# Patient Record
Sex: FEMALE | Race: OTHER | HISPANIC OR LATINO | Employment: UNEMPLOYED | URBAN - METROPOLITAN AREA
[De-identification: names, ages, dates, MRNs, and addresses within clinical notes are randomized per-mention and may not be internally consistent; named-entity substitution may affect disease eponyms.]

---

## 2021-04-25 ENCOUNTER — TELEPHONE (OUTPATIENT)
Dept: OTHER | Facility: OTHER | Age: 61
End: 2021-04-25

## 2021-04-26 NOTE — TELEPHONE ENCOUNTER
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

## 2021-04-27 NOTE — TELEPHONE ENCOUNTER
Spoke with patient, feeling unwell she believes due to allergies  Will be getting tested for Covid   Will call us back to reschedule once she receives results back

## 2021-05-06 ENCOUNTER — OFFICE VISIT (OUTPATIENT)
Dept: OBGYN CLINIC | Facility: MEDICAL CENTER | Age: 61
End: 2021-05-06
Payer: COMMERCIAL

## 2021-05-06 VITALS
WEIGHT: 243 LBS | SYSTOLIC BLOOD PRESSURE: 140 MMHG | DIASTOLIC BLOOD PRESSURE: 87 MMHG | HEART RATE: 72 BPM | HEIGHT: 63 IN | BODY MASS INDEX: 43.05 KG/M2

## 2021-05-06 DIAGNOSIS — M17.11 PRIMARY OSTEOARTHRITIS OF RIGHT KNEE: Primary | ICD-10-CM

## 2021-05-06 DIAGNOSIS — M19.019 OSTEOARTHRITIS OF AC (ACROMIOCLAVICULAR) JOINT: ICD-10-CM

## 2021-05-06 DIAGNOSIS — M75.102 NONTRAUMATIC TEAR OF LEFT ROTATOR CUFF, UNSPECIFIED TEAR EXTENT: ICD-10-CM

## 2021-05-06 PROCEDURE — 99203 OFFICE O/P NEW LOW 30 MIN: CPT | Performed by: ORTHOPAEDIC SURGERY

## 2021-05-06 RX ORDER — ALBUTEROL SULFATE 90 UG/1
2 AEROSOL, METERED RESPIRATORY (INHALATION)
COMMUNITY
Start: 2021-04-15

## 2021-05-06 RX ORDER — FLUTICASONE PROPIONATE 220 UG/1
AEROSOL, METERED RESPIRATORY (INHALATION)
COMMUNITY
Start: 2021-04-15

## 2021-05-06 RX ORDER — ACETAMINOPHEN 500 MG
500 TABLET ORAL
COMMUNITY

## 2021-05-06 RX ORDER — METOPROLOL SUCCINATE 50 MG/1
50 TABLET, EXTENDED RELEASE ORAL
COMMUNITY
Start: 2021-04-15

## 2021-05-06 NOTE — PROGRESS NOTES
Ortho Sports Medicine Knee New Patient Visit     Assesment:   64 y o  female right knee osteoarthritis and left shoulder possible rotator cuff tear    Plan:    Conservative treatment:    Ice to knee for 20 minutes at least 1-2 times daily  PT for ROM/strengthening to knee, hip and core and shoulder    Imaging:    No imaging was available for review today  X-rays at next visit    Injection:    A viscosupplementation injection was ordered and will be given at a future visit  Surgery:     No surgery is recommended at this point, continue with conservative treatment plan as noted  Follow up:    Return after VISCO  Chief Complaint   Patient presents with    Right Knee - Pain    Left Shoulder - Pain       History of Present Illness: The patient is a 64 y o  female whose occupation is disabled, referred to me by their primary care physician, seen in clinic for consultation of right knee pain and left shoulder  Pain is located anterior, posterior, lateral   The patient rates the pain as a 7/10  The pain has been present for 2 months  The shoulder bothers her all over  The patient denied sustaining any injury to the shoulder or knee  The mechanism of injury was uknown  The pain is characterized as dull, achy  The pain is present at all times  Pain is improved by rest, ice and NSAIDS  Pain is aggravated by stairs, squatting, weight bearing, walking, sitting, standing and pivoting on a planted foot  Symptoms include clicking, catching and popping  The patient has tried rest, ice and NSAIDS  Knee Surgical History:  None    Past Medical, Social and Family History:  History reviewed  No pertinent past medical history  History reviewed  No pertinent surgical history    Allergies   Allergen Reactions    Biaxin [Clarithromycin] Rash    Tetracycline Rash     Current Outpatient Medications on File Prior to Visit   Medication Sig Dispense Refill    albuterol (Seleta Fling HFA) 90 mcg/act inhaler Inhale 2 puffs      metoprolol succinate (TOPROL-XL) 50 mg 24 hr tablet Take 50 mg by mouth      acetaminophen (TYLENOL) 500 mg tablet Take 500 mg by mouth      Flovent  MCG/ACT inhaler inhale 1 puff INTO THE LUNGS twice a day      Menthol, Topical Analgesic, 16 % GEL Apply topically       No current facility-administered medications on file prior to visit  Social History     Socioeconomic History    Marital status: Single     Spouse name: Not on file    Number of children: Not on file    Years of education: Not on file    Highest education level: Not on file   Occupational History    Not on file   Social Needs    Financial resource strain: Not on file    Food insecurity     Worry: Not on file     Inability: Not on file    Transportation needs     Medical: Not on file     Non-medical: Not on file   Tobacco Use    Smoking status: Never Smoker    Smokeless tobacco: Never Used   Substance and Sexual Activity    Alcohol use: Not Currently    Drug use: Never    Sexual activity: Not on file   Lifestyle    Physical activity     Days per week: Not on file     Minutes per session: Not on file    Stress: Not on file   Relationships    Social connections     Talks on phone: Not on file     Gets together: Not on file     Attends Jainism service: Not on file     Active member of club or organization: Not on file     Attends meetings of clubs or organizations: Not on file     Relationship status: Not on file    Intimate partner violence     Fear of current or ex partner: Not on file     Emotionally abused: Not on file     Physically abused: Not on file     Forced sexual activity: Not on file   Other Topics Concern    Not on file   Social History Narrative    Not on file         I have reviewed the past medical, surgical, social and family history, medications and allergies as documented in the EMR      Review of systems: ROS is negative other than that noted in the HPI   Constitutional: Negative for fatigue and fever  HENT: Negative for sore throat  Respiratory: Negative for shortness of breath  Cardiovascular: Negative for chest pain  Gastrointestinal: Negative for abdominal pain  Endocrine: Negative for cold intolerance and heat intolerance  Genitourinary: Negative for flank pain  Musculoskeletal: Negative for back pain  Skin: Negative for rash  Allergic/Immunologic: Negative for immunocompromised state  Neurological: Negative for dizziness  Psychiatric/Behavioral: Negative for agitation  Physical Exam:    Blood pressure 140/87, pulse 72, height 5' 3" (1 6 m), weight 110 kg (243 lb)  General/Constitutional: NAD, well developed, well nourished  HENT: Normocephalic, atraumatic  CV: Intact distal pulses, regular rate  Resp: No respiratory distress or labored breathing  Lymphatic: No lymphadenopathy palpated  Neuro: Alert and Oriented x 3, no focal deficits  Psych: Normal mood, normal affect, normal judgement, normal behavior  Skin: Warm, dry, no rashes, no erythema      Knee Exam (focused): RIGHT LEFT   ROM:   0-130 0-130   Palpation: Effusion negative negative     MJL tenderness Negative Negative     LJL tenderness Negative Negative   Meniscus:  Esther Negative Negative    Apley's Compression Negative Negative   Instability: Varus stable stable     Valgus stable stable   Special Tests: Lachman Negative Negative     Posterior drawer Negative Negative     Anterior drawer Negative Negative     Pivot shift not tested not tested     Dial not tested not tested   Patella: Palpation no tenderness no tenderness     Mobility 1/4 1/4     Apprehension Negative Negative   Other: Single leg 1/4 squat not tested not tested      Shoulder focused exam:       RIGHT LEFT    Scapula Atrophy Negative Negative     Winging Negative Negative     Protraction Negative Negative    Rotator cuff SS 5/5 4/5     IS 5/5 5/5     SubS 5/5 5/5    ROM     170 ER0 60 60     ER90 90    90     IR90 T6    T6     IRb T6    T6    TTP: AC Negative Positive     Biceps Negative Negative     Coracoid Negative Negative    Special Tests: O'Briens Negative Negative     Payne-shear Negative Negative     Cross body Adduction Negative Negative     Speeds  Negative Negative     Srinivas's Negative Negative     Whipple Negative Positive       Neer Negative Negative     Cochran Negative Negative    Instability: Apprehension & relocation not tested not tested     Load & shift not tested not tested    Other: Crank Negative Negative                 UE NV Exam: +2 Radial pulses bilaterally  Sensation intact to light touch C5-T1 bilaterally, Radial/median/ulnar nerve motor intact      Bilateral elbow, wrist, and and forearm ROM full, painless with passive ROM, no ttp or crepitance throughout extremities below shoulder joint    Cervical ROM is full without pain, numbness or tingling      LE NV Exam: +2 DP/PT pulses bilaterally  Sensation intact to light touch L2-S1 bilaterally     Bilateral hip ROM demonstrates no pain actively or passively    No calf tenderness to palpation bilaterally    Knee Imaging     No imaging available - we will obtain x-rays at next visit      Scribe Attestation    I,:  Duke Roman am acting as a scribe while in the presence of the attending physician :       I,:  [facility] DO Irene personally performed the services described in this documentation    as scribed in my presence :

## 2021-05-07 ENCOUNTER — TELEPHONE (OUTPATIENT)
Dept: OBGYN CLINIC | Facility: OTHER | Age: 61
End: 2021-05-07

## 2021-05-07 NOTE — TELEPHONE ENCOUNTER
[facility] [place] [facility] [employer] is requesting we fax the Prescription for [employer]      Fax # [phone]  Attn : [facility]

## 2021-05-13 ENCOUNTER — EVALUATION (OUTPATIENT)
Dept: PHYSICAL THERAPY | Facility: CLINIC | Age: 61
End: 2021-05-13
Payer: COMMERCIAL

## 2021-05-13 DIAGNOSIS — M17.11 PRIMARY OSTEOARTHRITIS OF RIGHT KNEE: ICD-10-CM

## 2021-05-13 DIAGNOSIS — M75.102 NONTRAUMATIC TEAR OF LEFT ROTATOR CUFF, UNSPECIFIED TEAR EXTENT: ICD-10-CM

## 2021-05-13 DIAGNOSIS — M19.019 OSTEOARTHRITIS OF AC (ACROMIOCLAVICULAR) JOINT: ICD-10-CM

## 2021-05-13 PROCEDURE — 97162 PT EVAL MOD COMPLEX 30 MIN: CPT | Performed by: PHYSICAL THERAPIST

## 2021-05-13 NOTE — PROGRESS NOTES
PT Evaluation     Today's date: 2021  Patient name: Randolm Schlatter  : 1960  MRN: [id number]  Referring provider: Rito Amato DO  Dx:   Encounter Diagnosis     ICD-10-CM    1  Primary osteoarthritis of right knee  M17 11 Ambulatory referral to Physical Therapy   2  Nontraumatic tear of left rotator cuff, unspecified tear extent  M75 102 Ambulatory referral to Physical Therapy   3  Osteoarthritis of AC (acromioclavicular) joint  M19 019 Ambulatory referral to Physical Therapy                  Assessment  Assessment details: Randolm Schlatter is a 64 y o  female presenting to PT with pain, decreased knee range of motion, decreased shoulder ROM, decreased upper and lower extremity strength, balance deficits, and decreased tolerance to activity  Due to these impairments, pt has difficulty with walking, stair climbing, OH motion, performing household chores, dressing, and bathing independently  Patient would benefit from skilled PT services to address these impairments and to maximize function in order to improve quality of life  Thank you for the referral    Impairments: abnormal gait, abnormal or restricted ROM, activity intolerance, impaired balance, impaired physical strength, lacks appropriate home exercise program, pain with function and poor body mechanics    Symptom irritability: moderateUnderstanding of Dx/Px/POC: excellent  Goals  STG  1) Pain levels will improve by 2-3 in 3 weeks  2) L shoulder ROM will improve >15 deg  In all limited planes in 3 weeks  3) Pt will be able to ambulate with >150 feet with SPC in 3 weeks  4) Pt will reduce 5x STS time by >20 seconds in 3 weeks to reduce the risk of falls  LTG  1) Patient is independent in HEP  2) Patient will ascend/descend one flight of stairs independently with less use of the railing in 6 weeks  3) L shoulder ROM will be WFL by DC  4) R knee strength will improve by 1/2 grade in 6 weeks    5) Pt will be able to complete OH activity with L UE with limited difficulty by DC  Plan  Patient would benefit from: skilled physical therapy  Planned modality interventions: cryotherapy and hydrotherapy  Planned therapy interventions: balance, abdominal trunk stabilization, joint mobilization, manual therapy, massage, neuromuscular re-education, patient education, breathing training, body mechanics training, therapeutic activities, stretching, strengthening, flexibility, gait training, functional ROM exercises, therapeutic exercise, home exercise program and postural training  Frequency: 2x week  Duration in weeks: 4  Treatment plan discussed with: patient        Subjective Evaluation    History of Present Illness  Mechanism of injury: Pt had R hip done 2011 and her R knee done 2020  She was ready to go back to work in 2020 but was having a lot of pain in her L hip  Her doctor took xrays and saw that it was bone on bone, so got her L hip done on 2020  Currently, her L shoulder is the worst pain  She notes that she cannot lift a gallon of milk with her L side  She can brush her hair, but it is difficult  She notes that she does a lot with her R shoulder, but that one is starting to hurt as well  She lives in a one floor cabin, she has one step in  She notes that she needs to hold onto something and it is hard for her  She does use a SPC on a daily basis, and when she has bad days, she uses a rolling walker  Sleeping is very difficult for her  She notes that she barely sleeps, she has sleep apnea and just does not sleep  She did get approved to do injections in her L knee, she is going on   She notes that all activities at home are difficult with her shoulder and knee  She notes that her low back bothers her a lot, but she is dealing more with her knee and shoulder pain  Pt was a CNA, is retired now     Quality of life: excellent    Pain  Current pain ratin  At best pain ratin  At worst pain ratin  Location: L shoulder, R knee joint   Quality: throbbing and knife-like  Relieving factors: ice  Aggravating factors: walking, standing, stair climbing and lifting    Social Support  Steps to enter house: yes (1 step to get in)  Stairs in house: no   Lives in: [place] house  Lives with: adult children    Employment status: not working  Hand dominance: right      Diagnostic Tests  No diagnostic tests performed  Treatments  No previous or current treatments  Patient Goals  Patient goals for therapy: decreased pain, increased motion, increased strength, independence with ADLs/IADLs and improved balance  Patient goal: " I want to be able to walk and not have sharp pain all the time "        Objective     Postural Observations  Seated posture: poor  Standing posture: poor        Palpation   Left   Hypertonic in the upper trapezius  Tenderness of the middle trapezius, supraspinatus and upper trapezius  Tenderness     Left Shoulder   Tenderness in the [facility] joint and medial scapula       Active Range of Motion   Left Shoulder   Flexion: 145 degrees with pain  Abduction: 125 degrees with pain  External rotation BTH: C3 with pain  Internal rotation BTB: lumbar     Right Shoulder   Flexion: WFL  Abduction: 145 degrees with pain  External rotation BTH: C7   Internal rotation BTB: T9   Left Knee   Normal active range of motion    Right Knee   Normal active range of motion  Flexion: WFL and with pain  Extension: WFL and with pain    Strength/Myotome Testing     Left Shoulder     Planes of Motion   Flexion: 4- (pain)   Abduction: 3+ (pain)   External rotation at 90°: 4-   Internal rotation at 90°: 4-     Isolated Muscles   Middle deltoid: 4     Right Shoulder     Planes of Motion   Flexion: 4   Abduction: 4   External rotation at 90°: 4   Internal rotation at 90°: 4     Isolated Muscles   Middle deltoid: 4     Left Hip   Planes of Motion   Flexion: 4  Abduction: 4  Adduction: 4-    Right Hip   Planes of Motion Flexion: 4-  Abduction: 4  Adduction: 4-    Left Knee   Flexion: 4  Extension: 4  Quadriceps contraction: fair    Right Knee   Flexion: 4  Extension: 4- (pain)  Quadriceps contraction: poor    Tests     Left Shoulder   Positive empty can, full can and Hawkin's  Negative AC shear, belly press, drop arm and Yergason's  Ambulation     Observational Gait   Decreased walking speed, stride length, left step length and right step length  Left arm swing: decreased  Right arm swing: decreased    Additional Observational Gait Details  Pt using a SPC at IE today, she notes that she uses a rolling walker at home for ambulation     Fair heel/toe pattern BL, antalgic gait due to R knee pain during ambulation  Comments   5x STS: 1' 17"         Precautions: N/A     Daily Treatment Diary:      Initial Evaluation Date: 05/13/21  Compliance 5/13                     Visit Number 1                    Re-Eval  IE                 [address]                           5/13                     Manual                      L shoulder ROM                      R knee patellar mobs  R knee ROM                       Ther-Ex                      pullys                      Nu-step                      Quad sets HEP                     Heels slides HEP                     Mini squats                      SLR                      clamshells HEP                     Hip add    HEP                                  Shoulder cane flex/abd HEP            Shoulder retraction HEP            Finger ladder             Shoulder isometrics                                                                      Neuro Re-Ed                                                                                                Ther-Act                                                               Modalities

## 2021-05-13 NOTE — LETTER
May 19, 2021    Tomas Sadler DO  [address]   [address]    Patient: Chata Gutiérrez   YOB: 1960   Date of Visit: 2021     Encounter Diagnosis     ICD-10-CM    1  Primary osteoarthritis of right knee  M17 11 Ambulatory referral to Physical Therapy   2  Nontraumatic tear of left rotator cuff, unspecified tear extent  M75 102 Ambulatory referral to Physical Therapy   3  Osteoarthritis of AC (acromioclavicular) joint  M19 019 Ambulatory referral to Physical Therapy       Dear Dr Johan Monaco: Thank you for your recent referral of Chata Gutiérrez  Please review the attached evaluation summary from Norma's recent visit  Please verify that you agree with the plan of care by signing the attached order  If you have any questions or concerns, please do not hesitate to call  I sincerely appreciate the opportunity to share in the care of one of your patients and hope to have another opportunity to work with you in the near future  Sincerely,    Sagrario Tellez, PT      Referring Provider:      I certify that I have read the below Plan of Care and certify the need for these services furnished under this plan of treatment while under my care  Familia Little [address] [address]  Via Fax: [phone]          PT Evaluation     Today's date: 2021  Patient name: Chata Gutiérrez  : 1960  MRN: [id number]  Referring provider: Celeste Schofield DO  Dx:   Encounter Diagnosis     ICD-10-CM    1  Primary osteoarthritis of right knee  M17 11 Ambulatory referral to Physical Therapy   2  Nontraumatic tear of left rotator cuff, unspecified tear extent  M75 102 Ambulatory referral to Physical Therapy   3   Osteoarthritis of AC (acromioclavicular) joint  M19 019 Ambulatory referral to Physical Therapy                  Assessment  Assessment details: Chata Gutiérrez is a 64 y o  female presenting to PT with pain, decreased knee range of motion, decreased shoulder ROM, decreased upper and lower extremity strength, balance deficits, and decreased tolerance to activity  Due to these impairments, pt has difficulty with walking, stair climbing, OH motion, performing household chores, dressing, and bathing independently  Patient would benefit from skilled PT services to address these impairments and to maximize function in order to improve quality of life  Thank you for the referral    Impairments: abnormal gait, abnormal or restricted ROM, activity intolerance, impaired balance, impaired physical strength, lacks appropriate home exercise program, pain with function and poor body mechanics    Symptom irritability: moderateUnderstanding of Dx/Px/POC: excellent  Goals  STG  1) Pain levels will improve by 2-3 in 3 weeks  2) L shoulder ROM will improve >15 deg  In all limited planes in 3 weeks  3) Pt will be able to ambulate with >150 feet with SPC in 3 weeks  4) Pt will reduce 5x STS time by >20 seconds in 3 weeks to reduce the risk of falls  LTG  1) Patient is independent in HEP  2) Patient will ascend/descend one flight of stairs independently with less use of the railing in 6 weeks  3) L shoulder ROM will be WFL by DC  4) R knee strength will improve by 1/2 grade in 6 weeks  5) Pt will be able to complete OH activity with L UE with limited difficulty by DC        Plan  Patient would benefit from: skilled physical therapy  Planned modality interventions: cryotherapy and hydrotherapy  Planned therapy interventions: balance, abdominal trunk stabilization, joint mobilization, manual therapy, massage, neuromuscular re-education, patient education, breathing training, body mechanics training, therapeutic activities, stretching, strengthening, flexibility, gait training, functional ROM exercises, therapeutic exercise, home exercise program and postural training  Frequency: 2x week  Duration in weeks: 4  Treatment plan discussed with: patient        Subjective Evaluation    History of Present Illness  Mechanism of injury: Pt had R hip done 2011 and her R knee done 2020  She was ready to go back to work in 2020 but was having a lot of pain in her L hip  Her doctor took xrays and saw that it was bone on bone, so got her L hip done on 2020  Currently, her L shoulder is the worst pain  She notes that she cannot lift a gallon of milk with her L side  She can brush her hair, but it is difficult  She notes that she does a lot with her R shoulder, but that one is starting to hurt as well  She lives in a one floor cabin, she has one step in  She notes that she needs to hold onto something and it is hard for her  She does use a SPC on a daily basis, and when she has bad days, she uses a rolling walker  Sleeping is very difficult for her  She notes that she barely sleeps, she has sleep apnea and just does not sleep  She did get approved to do injections in her L knee, she is going on   She notes that all activities at home are difficult with her shoulder and knee  She notes that her low back bothers her a lot, but she is dealing more with her knee and shoulder pain  Pt was a CNA, is retired now     Quality of life: excellent    Pain  Current pain ratin  At best pain ratin  At worst pain ratin  Location: L shoulder, R knee joint   Quality: throbbing and knife-like  Relieving factors: ice  Aggravating factors: walking, standing, stair climbing and lifting    Social Support  Steps to enter house: yes (1 step to get in)  Stairs in house: no   Lives in: [facility]  Lives with: adult children    Employment status: not working  Hand dominance: right      Diagnostic Tests  No diagnostic tests performed  Treatments  No previous or current treatments  Patient Goals  Patient goals for therapy: decreased pain, increased motion, increased strength, independence with ADLs/IADLs and improved balance  Patient goal: " I want to be able to walk and not have sharp pain all the time "        Objective     Postural Observations  Seated posture: poor  Standing posture: poor        Palpation   Left   Hypertonic in the upper trapezius  Tenderness of the middle trapezius, supraspinatus and upper trapezius  Tenderness     Left Shoulder   [facility] and medial scapula  Active Range of Motion   Left Shoulder   Flexion: 145 degrees with pain  Abduction: 125 degrees with pain  External rotation BTH: C3 with pain  Internal rotation BTB: lumbar     Right Shoulder   Flexion: WFL  Abduction: 145 degrees with pain  External rotation BTH: C7   Internal rotation BTB: T9   Left Knee   Normal active range of motion    Right Knee   Normal active range of motion  Flexion: WFL and with pain  Extension: WFL and with pain    Strength/Myotome Testing     Left Shoulder     Planes of Motion   Flexion: 4- (pain)   Abduction: 3+ (pain)   External rotation at 90°: 4-   Internal rotation at 90°: 4-     Isolated Muscles   Middle deltoid: 4     Right Shoulder     Planes of Motion   Flexion: 4   Abduction: 4   External rotation at 90°: 4   Internal rotation at 90°: 4     Isolated Muscles   Middle deltoid: 4     Left Hip   Planes of Motion   Flexion: 4  Abduction: 4  Adduction: 4-    Right Hip   Planes of Motion   Flexion: 4-  Abduction: 4  Adduction: 4-    Left Knee   Flexion: 4  Extension: 4  Quadriceps contraction: fair    Right Knee   Flexion: 4  Extension: 4- (pain)  Quadriceps contraction: poor    Tests     Left Shoulder   Positive empty can, full can and Hawkin's  Negative AC shear, belly press, drop arm and Yergason's  Ambulation     Observational Gait   Decreased walking speed, stride length, left step length and right step length     Left arm swing: decreased  Right arm swing: decreased    Additional Observational Gait Details  Pt using a SPC at  today, she notes that she uses a rolling walker at home for ambulation     Fair heel/toe pattern BL, antalgic gait due to R knee pain during ambulation  Comments   5x STS: 1' 17"         Precautions: N/A     Daily Treatment Diary:      Initial Evaluation Date: 05/13/21  Compliance 5/13                     Visit Number 1                    Re-Eval  IE                 [address]   Foto Captured Y                           5/13                     Manual                      L shoulder ROM                      R knee patellar mobs  R knee ROM                       Ther-Ex                      pullys                      Nu-step                      Quad sets HEP                     Heels slides HEP                     Mini squats                      SLR                      clamshells HEP                     Hip add    HEP                                  Shoulder cane flex/abd HEP            Shoulder retraction HEP            Finger ladder             Shoulder isometrics                                                                      Neuro Re-Ed                                                                                                Ther-Act                                                               Modalities

## 2021-05-17 ENCOUNTER — OFFICE VISIT (OUTPATIENT)
Dept: PHYSICAL THERAPY | Facility: CLINIC | Age: 61
End: 2021-05-17
Payer: COMMERCIAL

## 2021-05-17 DIAGNOSIS — M75.102 NONTRAUMATIC TEAR OF LEFT ROTATOR CUFF, UNSPECIFIED TEAR EXTENT: ICD-10-CM

## 2021-05-17 DIAGNOSIS — M19.019 OSTEOARTHRITIS OF AC (ACROMIOCLAVICULAR) JOINT: ICD-10-CM

## 2021-05-17 DIAGNOSIS — M17.11 PRIMARY OSTEOARTHRITIS OF RIGHT KNEE: Primary | ICD-10-CM

## 2021-05-17 PROCEDURE — 97110 THERAPEUTIC EXERCISES: CPT | Performed by: PHYSICAL THERAPIST

## 2021-05-17 PROCEDURE — 97140 MANUAL THERAPY 1/> REGIONS: CPT | Performed by: PHYSICAL THERAPIST

## 2021-05-17 NOTE — PROGRESS NOTES
Daily Note/ DC Summary      Today's date: 2021  Patient name: Jose G Maynard  : 1960  MRN: [id number]  Referring provider: Tuyet Voss DO  Dx:   Encounter Diagnosis     ICD-10-CM    1  Primary osteoarthritis of right knee  M17 11    2  Nontraumatic tear of left rotator cuff, unspecified tear extent  M75 102    3  Osteoarthritis of AC (acromioclavicular) joint  M19 019                   Subjective: Pt reports that her knee is feeling a lot better today, states that the shoulder exercises were a little painful, but she did them  Objective: See treatment diary below      Assessment: Tolerated treatment well  Introduced new exercises today, she was able to perform with moderate cuing for proper form and intensity  Pt does have some fear of her shoulder pain with all movement, educated pt on working through pain free ranges to help with her limited motion  She had a difficult time relaxing during manual work, will continue to work on in future sessions  Patient demonstrated fatigue post treatment and would benefit from continued PT to address ROM in shoulder and strength in LE  Plan: Continue per plan of care  Precautions: N/A     Daily Treatment Diary:      Initial Evaluation Date: 21  Compliance                    Visit Number 1 2                   Re-Eval  IE -                MC   Foto Captured Y -                                             Manual                      L shoulder ROM   arb                    R knee patellar mobs  -                   R knee ROM    -                   Ther-Ex                      pullys   5'                   Nu-step   5' L3                   Quad sets HEP  2x10 5"                   Heels slides HEP  20x                   Mini squats                      SLR   10x ea                   clamshells HEP  red 2x10                   Hip add  HEP  2x10 5"                                Shoulder cane flex/abd HEP 5x flex  p! Shoulder retraction HEP 15x 5"           Finger ladder  -           Shoulder isometrics  -                                                                    Neuro Re-Ed                                                                                                Ther-Act             Sit to stand 10x ea  10x                                               Modalities                      CP prn   10' post kyle SOFIA Summary: Pt has not called to schedule or return any phone calls regarding future appointments  At this time, pt is a self DC from therapy       Phillip Marroquin PT

## 2021-05-19 ENCOUNTER — APPOINTMENT (OUTPATIENT)
Dept: PHYSICAL THERAPY | Facility: CLINIC | Age: 61
End: 2021-05-19
Payer: COMMERCIAL

## 2021-05-19 ENCOUNTER — TRANSCRIBE ORDERS (OUTPATIENT)
Dept: PHYSICAL THERAPY | Facility: CLINIC | Age: 61
End: 2021-05-19

## 2021-05-19 DIAGNOSIS — M17.11 PRIMARY OSTEOARTHRITIS OF RIGHT KNEE: Primary | ICD-10-CM

## 2021-07-15 ENCOUNTER — PROCEDURE VISIT (OUTPATIENT)
Dept: OBGYN CLINIC | Facility: MEDICAL CENTER | Age: 61
End: 2021-07-15
Payer: COMMERCIAL

## 2021-07-15 ENCOUNTER — APPOINTMENT (OUTPATIENT)
Dept: RADIOLOGY | Facility: MEDICAL CENTER | Age: 61
End: 2021-07-15
Payer: COMMERCIAL

## 2021-07-15 VITALS
HEIGHT: 63 IN | DIASTOLIC BLOOD PRESSURE: 82 MMHG | WEIGHT: 243 LBS | SYSTOLIC BLOOD PRESSURE: 149 MMHG | BODY MASS INDEX: 43.05 KG/M2 | HEART RATE: 76 BPM

## 2021-07-15 DIAGNOSIS — M17.11 PRIMARY OSTEOARTHRITIS OF RIGHT KNEE: Primary | ICD-10-CM

## 2021-07-15 DIAGNOSIS — M17.11 PRIMARY OSTEOARTHRITIS OF RIGHT KNEE: ICD-10-CM

## 2021-07-15 PROCEDURE — 20610 DRAIN/INJ JOINT/BURSA W/O US: CPT | Performed by: ORTHOPAEDIC SURGERY

## 2021-07-15 PROCEDURE — 73564 X-RAY EXAM KNEE 4 OR MORE: CPT

## 2021-07-15 NOTE — PROGRESS NOTES
Ortho Sports Medicine Knee Visco Injection Visit     Assesment:   64 y o  female right knee OA    Plan:    Injection:  The risks and benefits of the injection (which include but are not limited to: infection, bleeding,damage to nerve/artery, need for further intervention), as well as the risks and benefits of all alternative treatments were explained and understood  The patient elected to proceed with injection  The procedure was done with aseptic technique, and the patient tolerated the procedure well with no complications  A viscosupplementation injection was performed  Ice to the knee 1-2 times daily for 20 minutes, for next 24-48 hrs  X-ray imaging of the knee was reviewed  Follow up:  Return if symptoms worsen or fail to improve  Chief Complaint   Patient presents with    Right Knee - Follow-up       History of Present Illness: The patient is returns for  Right knee Durolene visco supplementation injection  Since the prior visit, She reports no improvement  Patient does report that she has history of receiving prior corticosteroid injections to this knee without improvement  She denies ever having viscosupplementation injections  She denies ever having prior surgery to this knee as well  She would like to hold off getting a total knee arthroplasty as long as possible if it is not necessary  Patient denies fevers, chills, and sweats  Denies numbness and tingling  Denies chest pain, shortness of breath, calf pain, and warmth to the knee  I have reviewed the past medical, surgical, social and family history, medications and allergies as documented in the EMR  Review of systems: ROS is negative other than that noted in the HPI  Constitutional: Negative for fatigue and fever  Physical Exam:    Blood pressure 149/82, pulse 76, height 5' 3" (1 6 m), weight 110 kg (243 lb)      General/Constitutional: NAD, well developed, well nourished  HENT: Normocephalic, atraumatic  CV: Intact distal pulses, regular rate  Resp: No respiratory distress or labored breathing  Lymphatic: No lymphadenopathy palpated  Neuro: Alert and Oriented x 3, no focal deficits  Psych: Normal mood, normal affect, normal judgement, normal behavior  Skin: Warm, dry, no rashes, no erythema    Imaging:   x-ray imaging of the right knee was reviewed and demonstrates mild medial lateral compartment OA with joint space narrowing, sclerotic changes, and osteophyte formation  I do not currently have the radiologist report to review but will review when it is available  Large joint arthrocentesis: R knee  Universal Protocol:  Consent given by: patient  Time out: Immediately prior to procedure a "time out" was called to verify the correct patient, procedure, equipment, support staff and site/side marked as required    Timeout called at: 7/15/2021 10:38 AM   Patient understanding: patient states understanding of the procedure being performed  Site marked: the operative site was marked  Patient identity confirmed: verbally with patient    Supporting Documentation  Indications: pain   Procedure Details  Location: knee - R knee  Preparation: Patient was prepped and draped in the usual sterile fashion  Needle size: 22 G  Ultrasound guidance: no  Approach: anterolateral  Medications administered: 3 mL sodium hyaluronate 60 MG/3ML    Patient tolerance: patient tolerated the procedure well with no immediate complications  Dressing:  Sterile dressing applied

## 2023-05-22 ENCOUNTER — APPOINTMENT (EMERGENCY)
Dept: CT IMAGING | Facility: HOSPITAL | Age: 63
End: 2023-05-22

## 2023-05-22 ENCOUNTER — HOSPITAL ENCOUNTER (EMERGENCY)
Facility: HOSPITAL | Age: 63
Discharge: HOME | End: 2023-05-22
Attending: EMERGENCY MEDICINE | Admitting: EMERGENCY MEDICINE

## 2023-05-22 VITALS
DIASTOLIC BLOOD PRESSURE: 77 MMHG | OXYGEN SATURATION: 96 % | HEART RATE: 86 BPM | WEIGHT: 268 LBS | RESPIRATION RATE: 20 BRPM | HEIGHT: 63 IN | TEMPERATURE: 97.7 F | SYSTOLIC BLOOD PRESSURE: 166 MMHG | BODY MASS INDEX: 47.48 KG/M2

## 2023-05-22 DIAGNOSIS — R10.31 RIGHT LOWER QUADRANT PAIN: Primary | ICD-10-CM

## 2023-05-22 LAB
ALBUMIN SERPL BCP-MCNC: 4.1 G/DL (ref 3.5–5)
ALP SERPL-CCNC: 61 U/L (ref 34–104)
ALT SERPL W P-5'-P-CCNC: 20 U/L (ref 7–52)
ANION GAP SERPL CALCULATED.3IONS-SCNC: 6 MMOL/L (ref 4–13)
APTT PPP: 25 SECONDS (ref 23–37)
AST SERPL W P-5'-P-CCNC: 22 U/L (ref 13–39)
BACTERIA UR QL AUTO: NORMAL /HPF
BASOPHILS # BLD AUTO: 0.06 THOUSANDS/ÂΜL (ref 0–0.1)
BASOPHILS NFR BLD AUTO: 1 % (ref 0–1)
BILIRUB SERPL-MCNC: 0.27 MG/DL (ref 0.2–1)
BILIRUB UR QL STRIP: NEGATIVE
BUN SERPL-MCNC: 16 MG/DL (ref 5–25)
CALCIUM SERPL-MCNC: 8.8 MG/DL (ref 8.4–10.2)
CHLORIDE SERPL-SCNC: 105 MMOL/L (ref 96–108)
CLARITY UR: CLEAR
CO2 SERPL-SCNC: 31 MMOL/L (ref 21–32)
COLOR UR: YELLOW
CREAT SERPL-MCNC: 0.78 MG/DL (ref 0.6–1.3)
EOSINOPHIL # BLD AUTO: 0.16 THOUSAND/ÂΜL (ref 0–0.61)
EOSINOPHIL NFR BLD AUTO: 2 % (ref 0–6)
ERYTHROCYTE [DISTWIDTH] IN BLOOD BY AUTOMATED COUNT: 12.8 % (ref 11.6–15.1)
GFR SERPL CREATININE-BSD FRML MDRD: 82 ML/MIN/1.73SQ M
GLUCOSE SERPL-MCNC: 85 MG/DL (ref 65–140)
GLUCOSE UR STRIP-MCNC: NEGATIVE MG/DL
HCT VFR BLD AUTO: 46 % (ref 34.8–46.1)
HGB BLD-MCNC: 15.2 G/DL (ref 11.5–15.4)
HGB UR QL STRIP.AUTO: NEGATIVE
IMM GRANULOCYTES # BLD AUTO: 0.02 THOUSAND/UL (ref 0–0.2)
IMM GRANULOCYTES NFR BLD AUTO: 0 % (ref 0–2)
INR PPP: 0.84 (ref 0.84–1.19)
KETONES UR STRIP-MCNC: NEGATIVE MG/DL
LACTATE SERPL-SCNC: 1.1 MMOL/L (ref 0.5–2)
LEUKOCYTE ESTERASE UR QL STRIP: NEGATIVE
LIPASE SERPL-CCNC: 44 U/L (ref 11–82)
LYMPHOCYTES # BLD AUTO: 2.7 THOUSANDS/ÂΜL (ref 0.6–4.47)
LYMPHOCYTES NFR BLD AUTO: 25 % (ref 14–44)
MCH RBC QN AUTO: 30.5 PG (ref 26.8–34.3)
MCHC RBC AUTO-ENTMCNC: 33 G/DL (ref 31.4–37.4)
MCV RBC AUTO: 92 FL (ref 82–98)
MONOCYTES # BLD AUTO: 0.72 THOUSAND/ÂΜL (ref 0.17–1.22)
MONOCYTES NFR BLD AUTO: 7 % (ref 4–12)
NEUTROPHILS # BLD AUTO: 7.06 THOUSANDS/ÂΜL (ref 1.85–7.62)
NEUTS SEG NFR BLD AUTO: 65 % (ref 43–75)
NITRITE UR QL STRIP: NEGATIVE
NON-SQ EPI CELLS URNS QL MICRO: NORMAL /HPF
NRBC BLD AUTO-RTO: 0 /100 WBCS
PH UR STRIP.AUTO: 6.5 [PH]
PLATELET # BLD AUTO: 278 THOUSANDS/UL (ref 149–390)
PMV BLD AUTO: 10.6 FL (ref 8.9–12.7)
POTASSIUM SERPL-SCNC: 3.7 MMOL/L (ref 3.5–5.3)
PROT SERPL-MCNC: 7.5 G/DL (ref 6.4–8.4)
PROT UR STRIP-MCNC: ABNORMAL MG/DL
PROTHROMBIN TIME: 11.7 SECONDS (ref 11.6–14.5)
RBC # BLD AUTO: 4.98 MILLION/UL (ref 3.81–5.12)
RBC #/AREA URNS AUTO: NORMAL /HPF
SODIUM SERPL-SCNC: 142 MMOL/L (ref 135–147)
SP GR UR STRIP.AUTO: 1.02 (ref 1–1.03)
UROBILINOGEN UR QL STRIP.AUTO: 1 E.U./DL
WBC # BLD AUTO: 10.72 THOUSAND/UL (ref 4.31–10.16)
WBC #/AREA URNS AUTO: NORMAL /HPF

## 2023-05-22 RX ADMIN — IOHEXOL 100 ML: 350 INJECTION, SOLUTION INTRAVENOUS at 21:01

## 2023-05-23 NOTE — ED PROVIDER NOTES
History  Chief Complaint   Patient presents with   • Abdominal Pain     Pt reports RLQ pain x6 days  Seen by express care and advised to come to ED to r/o appendicitis  Patient complains of right lower quadrant pain that has been ongoing for the past 6 days  No radiation of the pain  No nausea or vomiting  No dysuria or frequency  Appetite has been normal   Last bowel was today and normal   Nothing taken for pain  Seen in urgent care and sent here for further evaluation  No history of abdominal surgery   used: No    Abdominal Pain  Pain location:  RLQ  Pain quality: aching    Pain radiates to:  Does not radiate  Pain severity:  Mild  Onset quality:  Gradual  Duration:  6 days  Timing:  Constant  Progression:  Unchanged  Chronicity:  New  Context: not awakening from sleep, not eating, not recent illness and not sick contacts    Relieved by:  Nothing  Worsened by:  Nothing  Ineffective treatments:  None tried  Associated symptoms: no anorexia, no chest pain, no chills, no constipation, no cough, no diarrhea, no dysuria, no fatigue, no fever, no flatus, no hematuria, no nausea, no shortness of breath, no sore throat and no vomiting        Prior to Admission Medications   Prescriptions Last Dose Informant Patient Reported? Taking? Flovent  MCG/ACT inhaler   Yes No   Sig: inhale 1 puff INTO THE LUNGS twice a day   Menthol, Topical Analgesic, 16 % GEL   Yes No   Sig: Apply topically   acetaminophen (TYLENOL) 500 mg tablet   Yes No   Sig: Take 500 mg by mouth   albuterol (PROVENTIL HFA,VENTOLIN HFA) 90 mcg/act inhaler   Yes No   Sig: Inhale 2 puffs   metoprolol succinate (TOPROL-XL) 50 mg 24 hr tablet   Yes No   Sig: Take 50 mg by mouth      Facility-Administered Medications: None       History reviewed  No pertinent past medical history  History reviewed  No pertinent surgical history  History reviewed  No pertinent family history    I have reviewed and agree with the history as documented  E-Cigarette/Vaping     E-Cigarette/Vaping Substances     Social History     Tobacco Use   • Smoking status: Never   • Smokeless tobacco: Never   Substance Use Topics   • Alcohol use: Not Currently   • Drug use: Never       Review of Systems   Constitutional: Negative for chills, fatigue and fever  HENT: Negative for ear pain, hearing loss, sore throat, trouble swallowing and voice change  Eyes: Negative for pain and discharge  Respiratory: Negative for cough, shortness of breath and wheezing  Cardiovascular: Negative for chest pain and palpitations  Gastrointestinal: Positive for abdominal pain  Negative for anorexia, blood in stool, constipation, diarrhea, flatus, nausea and vomiting  Genitourinary: Negative for dysuria, flank pain, frequency and hematuria  Musculoskeletal: Negative for joint swelling, neck pain and neck stiffness  Skin: Negative for rash and wound  Neurological: Negative for dizziness, seizures, syncope, facial asymmetry and headaches  Psychiatric/Behavioral: Negative for hallucinations, self-injury and suicidal ideas  All other systems reviewed and are negative  Physical Exam  Physical Exam  Vitals and nursing note reviewed  Constitutional:       General: She is not in acute distress  Appearance: She is well-developed  HENT:      Head: Normocephalic and atraumatic  Right Ear: External ear normal       Left Ear: External ear normal    Eyes:      General: No scleral icterus  Right eye: No discharge  Left eye: No discharge  Extraocular Movements: Extraocular movements intact  Conjunctiva/sclera: Conjunctivae normal    Cardiovascular:      Rate and Rhythm: Normal rate and regular rhythm  Heart sounds: Normal heart sounds  No murmur heard  Pulmonary:      Effort: Pulmonary effort is normal       Breath sounds: Normal breath sounds  No wheezing or rales     Abdominal:      General: Bowel sounds are normal  There is no distension  Palpations: Abdomen is soft  Tenderness: There is abdominal tenderness in the right lower quadrant  There is no guarding or rebound  Comments: Abdomen is tender in the right lower quadrant without guarding or rebound  No rash or lesions noted  Musculoskeletal:         General: No deformity  Normal range of motion  Cervical back: Normal range of motion and neck supple  Skin:     General: Skin is warm and dry  Findings: No rash  Neurological:      General: No focal deficit present  Mental Status: She is alert and oriented to person, place, and time  Cranial Nerves: No cranial nerve deficit  Psychiatric:         Mood and Affect: Mood normal          Behavior: Behavior normal          Thought Content: Thought content normal          Judgment: Judgment normal          Vital Signs  ED Triage Vitals [05/22/23 2012]   Temperature Pulse Respirations Blood Pressure SpO2   97 7 °F (36 5 °C) 87 16 (!) 173/112 98 %      Temp Source Heart Rate Source Patient Position - Orthostatic VS BP Location FiO2 (%)   Temporal Monitor Sitting Left arm --      Pain Score       6           Vitals:    05/22/23 2012 05/22/23 2107 05/22/23 2131   BP: (!) 173/112 (!) 177/81 166/77   Pulse: 87 100 86   Patient Position - Orthostatic VS: Sitting Lying Lying         Visual Acuity      ED Medications  Medications   iohexol (OMNIPAQUE) 350 MG/ML injection (SINGLE-DOSE) 100 mL (100 mL Intravenous Given 5/22/23 2101)       Diagnostic Studies  Results Reviewed     Procedure Component Value Units Date/Time    Lactic acid, plasma (w/reflex if result > 2 0) [[id number]]  (Normal) Collected: 05/22/23 2049    Lab Status: Final result Specimen: Blood from Arm, Right Updated: 05/22/23 2126     LACTIC ACID 1 1 mmol/L     Narrative:      Result may be elevated if tourniquet was used during collection      Lipase [[id number]]  (Normal) Collected: 05/22/23 2049    Lab Status: Final result Specimen: Blood from Arm, Right Updated: 05/22/23 2125     Lipase 44 u/L     Comprehensive metabolic panel [[id number]] Collected: 05/22/23 2049    Lab Status: Final result Specimen: Blood from Arm, Right Updated: 05/22/23 2125     Sodium 142 mmol/L      Potassium 3 7 mmol/L      Chloride 105 mmol/L      CO2 31 mmol/L      ANION GAP 6 mmol/L      BUN 16 mg/dL      Creatinine 0 78 mg/dL      Glucose 85 mg/dL      Calcium 8 8 mg/dL      AST 22 U/L      ALT 20 U/L      Alkaline Phosphatase 61 U/L      Total Protein 7 5 g/dL      Albumin 4 1 g/dL      Total Bilirubin 0 27 mg/dL      eGFR 82 ml/min/1 73sq m     Narrative:      [place] guidelines for Chronic Kidney Disease (CKD):   •  Stage 1 with normal or high GFR (GFR > 90 mL/min/1 73 square meters)  •  Stage 2 Mild CKD (GFR = 60-89 mL/min/1 73 square meters)  •  Stage 3A Moderate CKD (GFR = 45-59 mL/min/1 73 square meters)  •  Stage 3B Moderate CKD (GFR = 30-44 mL/min/1 73 square meters)  •  Stage 4 Severe CKD (GFR = 15-29 mL/min/1 73 square meters)  •  Stage 5 End Stage CKD (GFR <15 mL/min/1 73 square meters)  Note: GFR calculation is accurate only with a steady state creatinine    Protime-INR [[id number]]  (Normal) Collected: 05/22/23 2049    Lab Status: Final result Specimen: Blood from Arm, Right Updated: 05/22/23 2118     Protime 11 7 seconds      INR 0 84    APTT [[id number]]  (Normal) Collected: 05/22/23 2049    Lab Status: Final result Specimen: Blood from Arm, Right Updated: 05/22/23 2118     PTT 25 seconds     Urine Microscopic [[id number]]  (Normal) Collected: 05/22/23 2049    Lab Status: Final result Specimen: Urine, Clean Catch Updated: 05/22/23 2112     RBC, UA None Seen /hpf      WBC, UA 0-1 /hpf      Epithelial Cells Occasional /hpf      Bacteria, UA None Seen /hpf     UA w Reflex to Microscopic w Reflex to Culture [[id number]]  (Abnormal) Collected: 05/22/23 2049    Lab Status: Final result Specimen: Urine, Clean Catch Updated: 05/22/23 2102     Color, UA Yellow     Clarity, UA Clear     Specific Gravity, UA 1 025     pH, UA 6 5     Leukocytes, UA Negative     Nitrite, UA Negative     Protein, UA Trace mg/dl      Glucose, UA Negative mg/dl      Ketones, UA Negative mg/dl      Urobilinogen, UA 1 0 E U /dl      Bilirubin, UA Negative     Occult Blood, UA Negative    CBC and differential [[id number]]  (Abnormal) Collected: 05/22/23 2049    Lab Status: Final result Specimen: Blood from Arm, Right Updated: 05/22/23 2055     WBC 10 72 Thousand/uL      RBC 4 98 Million/uL      Hemoglobin 15 2 g/dL      Hematocrit 46 0 %      MCV 92 fL      MCH 30 5 pg      MCHC 33 0 g/dL      RDW 12 8 %      MPV 10 6 fL      Platelets 698 Thousands/uL      nRBC 0 /100 WBCs      Neutrophils Relative 65 %      Immat GRANS % 0 %      Lymphocytes Relative 25 %      Monocytes Relative 7 %      Eosinophils Relative 2 %      Basophils Relative 1 %      Neutrophils Absolute 7 06 Thousands/µL      Immature Grans Absolute 0 02 Thousand/uL      Lymphocytes Absolute 2 70 Thousands/µL      Monocytes Absolute 0 72 Thousand/µL      Eosinophils Absolute 0 16 Thousand/µL      Basophils Absolute 0 06 Thousands/µL                  CT abdomen pelvis with contrast   Final Result by Mckinley Mauro MD (05/22 2145)      No evidence of acute pathology throughout the abdomen or pelvis  Workstation performed: [id number]                    Procedures  Procedures         ED Course  ED Course as of 05/22/23 2148   Mon May 22, 2023   2147 Urinalysis is unremarkable for UTI  CAT scan shows no evidence of any acute infection  No appendicitis  No diverticulitis  No hernia  SBIRT 20yo+    Flowsheet Row Most Recent Value   Initial Alcohol Screen: US AUDIT-C     1  How often do you have a drink containing alcohol? 0 Filed at: 05/22/2023 2052   2  How many drinks containing alcohol do you have on a typical day you are drinking? 0 Filed at: 05/22/2023 2052   3a   Male UNDER 65: How often do you have five or more drinks on one occasion? 0 Filed at: 05/22/2023 2052   3b  FEMALE Any Age, or MALE 65+: How often do you have 4 or more drinks on one occassion? 0 Filed at: 05/22/2023 2052   Audit-C Score 0 Filed at: 05/22/2023 2052   AURE: How many times in the past year have you    Used an illegal drug or used a prescription medication for non-medical reasons? Never Filed at: 05/22/2023 2052                    Medical Decision Making  Amount and/or Complexity of Data Reviewed  Labs: ordered  Decision-making details documented in ED Course  Radiology: ordered  Decision-making details documented in ED Course  Risk  Risk Details: We will check urine to rule out UTI  We will do CAT scan to look for appendicitis, diverticulitis, bowel obstruction, enteritis, hernia  We will do basic blood work to look for any elevated liver function test         Disposition  Final diagnoses:   Right lower quadrant pain     Time reflects when diagnosis was documented in both MDM as applicable and the Disposition within this note     Time User Action Codes Description Comment    5/22/2023  9:48 PM Alejandro Roberts Add [R10 31] Right lower quadrant pain       ED Disposition     ED Disposition   Discharge    Condition   Stable    Date/Time   Mon May 22, 2023  9:48 PM    Comment   Nannie Severance discharge to home/self care  Follow-up Information    None         Patient's Medications   Discharge Prescriptions    No medications on file       No discharge procedures on file      PDMP Review     None          ED Provider  Electronically Signed by           Makenna Guerra MD  05/22/23 7920